# Patient Record
Sex: FEMALE
[De-identification: names, ages, dates, MRNs, and addresses within clinical notes are randomized per-mention and may not be internally consistent; named-entity substitution may affect disease eponyms.]

---

## 2017-10-07 ENCOUNTER — HOSPITAL ENCOUNTER (EMERGENCY)
Dept: HOSPITAL 12 - ER | Age: 57
Discharge: HOME | End: 2017-10-07
Payer: MEDICAID

## 2017-10-07 VITALS — HEIGHT: 63 IN | WEIGHT: 108 LBS | BODY MASS INDEX: 19.14 KG/M2

## 2017-10-07 VITALS — DIASTOLIC BLOOD PRESSURE: 89 MMHG | SYSTOLIC BLOOD PRESSURE: 111 MMHG

## 2017-10-07 DIAGNOSIS — J32.9: Primary | ICD-10-CM

## 2017-10-07 PROCEDURE — A4663 DIALYSIS BLOOD PRESSURE CUFF: HCPCS

## 2017-10-07 NOTE — NUR
Patient discharged to home in stable conditon.  Written and verbal after care 
instructions given. 

Patient verbalizes understanding of instructions.pt walks in steady gait. pt 
with sister.

## 2019-11-24 ENCOUNTER — HOSPITAL ENCOUNTER (EMERGENCY)
Dept: HOSPITAL 54 - ER | Age: 59
Discharge: HOME | End: 2019-11-24
Payer: COMMERCIAL

## 2019-11-24 VITALS — WEIGHT: 108 LBS | BODY MASS INDEX: 19.14 KG/M2 | HEIGHT: 63 IN

## 2019-11-24 VITALS — SYSTOLIC BLOOD PRESSURE: 119 MMHG | DIASTOLIC BLOOD PRESSURE: 75 MMHG

## 2019-11-24 DIAGNOSIS — S06.0X0A: Primary | ICD-10-CM
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
## 2019-11-24 NOTE — NUR
"HIT BACK OF THE HEAD ON A METAL RACK AT WORK AT 4PM TODAY; PAIN 6/10 NOTICABLE 
SMALL BUMP ON BACK OF THE HEAD, DENIES NAUSEA/VOMITTING" PT AAOX4, -SOB, NAD 
NOTED, VSS ,PENDING MD BARBER